# Patient Record
Sex: MALE | Race: WHITE | NOT HISPANIC OR LATINO | Employment: UNEMPLOYED | URBAN - METROPOLITAN AREA
[De-identification: names, ages, dates, MRNs, and addresses within clinical notes are randomized per-mention and may not be internally consistent; named-entity substitution may affect disease eponyms.]

---

## 2023-04-07 ENCOUNTER — APPOINTMENT (OUTPATIENT)
Dept: CT IMAGING | Facility: CLINIC | Age: 28
End: 2023-04-07
Attending: EMERGENCY MEDICINE
Payer: COMMERCIAL

## 2023-04-07 ENCOUNTER — HOSPITAL ENCOUNTER (EMERGENCY)
Facility: CLINIC | Age: 28
Discharge: HOME | End: 2023-04-07
Attending: EMERGENCY MEDICINE | Admitting: EMERGENCY MEDICINE
Payer: COMMERCIAL

## 2023-04-07 VITALS
OXYGEN SATURATION: 95 % | RESPIRATION RATE: 16 BRPM | SYSTOLIC BLOOD PRESSURE: 117 MMHG | DIASTOLIC BLOOD PRESSURE: 57 MMHG | WEIGHT: 265 LBS | BODY MASS INDEX: 37.1 KG/M2 | TEMPERATURE: 98.7 F | HEART RATE: 61 BPM | HEIGHT: 71 IN

## 2023-04-07 DIAGNOSIS — M54.2 NECK PAIN: ICD-10-CM

## 2023-04-07 PROBLEM — R45.851 SUICIDAL IDEATION: Status: ACTIVE | Noted: 2022-04-02

## 2023-04-07 PROBLEM — F17.200 CURRENT EVERY DAY SMOKER: Status: ACTIVE | Noted: 2022-03-14

## 2023-04-07 PROBLEM — Z72.89 SELF-INJURIOUS BEHAVIOR: Status: ACTIVE | Noted: 2022-04-02

## 2023-04-07 PROBLEM — S39.94XA PENIS INJURY: Status: ACTIVE | Noted: 2022-04-02

## 2023-04-07 PROBLEM — R39.89 GENITAL SORE: Status: ACTIVE | Noted: 2022-04-02

## 2023-04-07 PROBLEM — K21.9 CHRONIC GERD: Status: ACTIVE | Noted: 2022-03-14

## 2023-04-07 PROBLEM — F29 PSYCHOSIS (H): Status: ACTIVE | Noted: 2022-04-02

## 2023-04-07 PROCEDURE — 99284 EMERGENCY DEPT VISIT MOD MDM: CPT | Mod: 25

## 2023-04-07 PROCEDURE — 72125 CT NECK SPINE W/O DYE: CPT

## 2023-04-07 RX ORDER — LIDOCAINE 4 G/G
1 PATCH TOPICAL EVERY 24 HOURS
Qty: 10 PATCH | Refills: 0 | Status: SHIPPED | OUTPATIENT
Start: 2023-04-07

## 2023-04-07 RX ORDER — CYCLOBENZAPRINE HCL 10 MG
10 TABLET ORAL 3 TIMES DAILY PRN
Qty: 20 TABLET | Refills: 0 | Status: SHIPPED | OUTPATIENT
Start: 2023-04-07

## 2023-04-07 ASSESSMENT — ACTIVITIES OF DAILY LIVING (ADL)
ADLS_ACUITY_SCORE: 33
ADLS_ACUITY_SCORE: 35

## 2023-04-07 NOTE — ED TRIAGE NOTES
Stiff neck, cannot turn all the way to right since this AM.  [place] a cracking sound when at work

## 2023-04-07 NOTE — Clinical Note
Melissa Edwards was seen and treated in our emergency department on 4/7/2023.  He may return to work on 04/09/2023.       If you have any questions or concerns, please don't hesitate to call.      Leroy Starkey MD

## 2023-04-08 NOTE — ED PROVIDER NOTES
EMERGENCY DEPARTMENT ENCOUNTER      NAME: Melissa Edwards  AGE: 27 year old male  YOB: 1995  MRN: [id number]  EVALUATION DATE & TIME: 2023  6:48 PM    PCP: No primary care provider on file.    ED PROVIDER: Leroy Starkey M.D.      Chief Complaint   Patient presents with     Neck Pain       FINAL IMPRESSION:  1. 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  Patient indicates pain primarily focused on the right posterior neck.  Minimal trauma, did say that he was doing some overhead lifting when the pain got worse today.  He has an intact neurological exam.  He does have some spasmed paraspinal musculature and decreased range of motion when turning his head to the right.  CT cervical spine negative for any fracture or subluxation.  There is no erythema warmth or any suggestion of infection.  I do think this is consistent with a paraspinal muscle strain.  We discussed supportive treatment plan for this.  I am going to prescribe him some muscle relaxers and lidocaine patches he will use in addition to Tylenol and NSAIDs.  Clinic follow-up or orthopedic follow-up was advised.  Patient was comfortable with this plan.  He was discharged in good condition.    6:56 PM I met with the patient to gather history and to perform my initial exam. I discussed the plan for care while in the Emergency Department.     At the conclusion of the encounter I discussed the results of all of the tests and the disposition. The questions were answered. The patient or family acknowledged understanding and was agreeable with the care plan.       Medical Decision Making    History:    Supplemental history from: N/A    External Record(s) reviewed: Documented in chart, if applicable.    Work Up:    Chart documentation includes differential considered and any EKGs or imaging independently interpreted by provider, where specified.    In  "additional to work up documented, I considered the following work up: Documented in chart, if applicable.    External consultation:    Discussion of management with another provider: Documented in chart, if applicable    Complicating factors:    Care impacted by chronic illness: N/A    Care affected by social determinants of health: N/A    Disposition considerations: Discharge. I prescribed additional prescription strength medication(s) as charted. See documentation for any additional details.        MEDICATIONS GIVEN IN THE EMERGENCY:  Medications - No data to display    NEW PRESCRIPTIONS STARTED AT TODAY'S ER VISIT  New Prescriptions    CYCLOBENZAPRINE (FLEXERIL) 10 MG TABLET    Take 1 tablet (10 mg) by mouth 3 times daily as needed for muscle spasms    LIDOCAINE (LIDOCARE) 4 % PATCH    Place 1 patch onto the skin every 24 hours To prevent lidocaine toxicity, patient should be patch free for 12 hrs daily.          =================================================================    HPI    Patient information was obtained from: Patient    Use of : N/A         Melissa Edwards is a 27 year old male with no pertinent history who presents to this ED by walk-in for evaluation of neck pain.    Patient endorses waking up with an \"uncomfortable\" neck and thought he just slept wrong. He states he went to work and everytime he would stretch his right arm up or look to the right he would get right sided neck pain. While patient was at work he was jumping down off of something when he heard a crunching sound and the neck pain worsened. Patient endorses taking aleve 1 hour ago. Patient denies numbness, weakness, or any other complaints at this time.       REVIEW OF SYSTEMS   All systems reviewed and negative except as noted in HPI.    PAST MEDICAL HISTORY:  History reviewed. No pertinent past medical history.    PAST SURGICAL HISTORY:  History reviewed. No pertinent surgical history.        CURRENT MEDICATIONS:    No " current facility-administered medications for this encounter.     Current Outpatient Medications   Medication     cyclobenzaprine (FLEXERIL) 10 MG tablet     Lidocaine (LIDOCARE) 4 % Patch         ALLERGIES:  No Known Allergies    FAMILY HISTORY:  History reviewed. No pertinent family history.    SOCIAL HISTORY:        VITALS:  /70   Pulse 77   Temp 98.7  F (37.1  C) (Oral)   Resp 16   Wt 120.2 kg (265 lb)   SpO2 97%     PHYSICAL EXAM    Constitutional: Well developed, Well nourished, NAD.  HENT: Head and neck are atraumatic.  There is some tenderness primarily focused on the right cervical paraspinal muscles.  No palpable fluctuance or asymmetric warmth or erythema.  Decreased range of motion when turning head to the right.  Eyes: EOMI, Conjunctiva normal.  Respiratory: Breathing comfortably on room air. Speaks full sentences easily. Lungs clear to ascultation.  Cardiovascular: Normal heart rate, Regular rhythm. No peripheral edema.  Abdomen: Soft  Musculoskeletal: Good range of motion in all major joints. No major deformities noted.  Integument: Warm, Dry.  Neurologic: Alert & awake, Normal motor function, Normal sensory function, No focal deficits noted.  Strength is 5 out of 5 with , elbow flexion and extension, and shoulder abduction.  Psychiatric: Cooperative. Affect appropriate.       RADIOLOGY:  Reviewed all pertinent imaging. Please see official radiology report.  Cervical spine CT w/o contrast   Final Result   IMPRESSION:   1.  No fracture or subluxation the cervical spine.               I, Quiana Javier, am serving as a scribe to document services personally performed by Dr. Leroy Starkey, based on my observation and the provider's statements to me. I, Leroy Starkey MD attest that Quiana Javier is acting in a scribe capacity, has observed my performance of the services and has documented them in accordance with my direction.    Leroy Starkey M.D.  Emergency Medicine  [facility]  [facility] EMERGENCY ROOM  [address] [address]  [phone]  Dept: [phone]     Leroy Starkey MD  04/07/23 2051

## 2023-04-08 NOTE — DISCHARGE INSTRUCTIONS
You were seen in the emergency department at [facility] for right-sided neck pain.  Your evaluation including CT scan looked stable and we did not see any signs of major trauma or other serious cause of the symptoms.  We do think it is consistent with a muscle strain which should improve over the next several days.  We would recommend trying to minimize heavy lifting and straining at least for the next few days.  You can continue naproxen twice a day and Tylenol 650 mg every 6 hours.  We are also going to prescribe you Flexeril and lidocaine patches you can use as prescribed.  We would like you to follow-up in clinic by next week if symptoms are not improving as expected.